# Patient Record
Sex: FEMALE | Race: WHITE | NOT HISPANIC OR LATINO | Employment: PART TIME | URBAN - METROPOLITAN AREA
[De-identification: names, ages, dates, MRNs, and addresses within clinical notes are randomized per-mention and may not be internally consistent; named-entity substitution may affect disease eponyms.]

---

## 2022-02-09 ENCOUNTER — OFFICE VISIT (OUTPATIENT)
Dept: PSYCHIATRY | Facility: CLINIC | Age: 22
End: 2022-02-09
Payer: COMMERCIAL

## 2022-02-09 DIAGNOSIS — F43.23 ADJUSTMENT DISORDER WITH MIXED ANXIETY AND DEPRESSED MOOD: Primary | ICD-10-CM

## 2022-02-09 PROCEDURE — 90791 PR PSYCHIATRIC DIAGNOSTIC EVALUATION: ICD-10-PCS | Mod: 95,,, | Performed by: SOCIAL WORKER

## 2022-02-09 PROCEDURE — 90791 PSYCH DIAGNOSTIC EVALUATION: CPT | Mod: 95,,, | Performed by: SOCIAL WORKER

## 2022-02-13 NOTE — PROGRESS NOTES
Psychiatry Initial Visit (PhD/LCSW)  Diagnostic Interview - CPT 66463    Date: 2/9/2022    Site: [facility] is on-site; patient's location: [facility] ([place], [place])       Referral source: self-referral     Clinical status of patient: Outpatient    Vargas Moore, a 21 y.o. female, for initial evaluation visit.  Met with patient.    Chief complaint/reason for encounter: depression, anxiety and interpersonal    History of present illness: 21 year old female patient is present for initial virtual visit with chief complaint of depression, anxiety, and interpersonal stress.  She is a freshman in college at [employer].  Reports that her first semester in college was difficult.  She had some difficulty adjusting to the lifestyle of a college student.  Feels like she went out drinking too much.  This semester she is focused on her academics.  She is also working part-time as a nanny.  She has been working on setting boundaries with her friends that do frequently go out to bars.  She reports having interpersonal stress.  Her father has a history of alcohol use disorder.  He has been in a few different treatment programs.  She is working on setting boundaries with him, as he continues to drink regularly.  In addition, her parents are currently going through a divorce.      Pain: noncontributory    Symptoms:   · Mood: some periods of sadness   · Anxiety: excessive anxiety/worry  · Substance abuse: denied  · Cognitive functioning: denied  · Health behaviors: noncontributory    Psychiatric history: none    Medical history: noncontributory    Family history of psychiatric illness: patient's father-history of alcohol use disorder, depression, and anxiety     Social history (marriage, employment, etc.): Patient is a freshman at [employer].  High school graduate.  Confucianism.  Raised by her parents.  One brother, one sister.      Substance use:   Alcohol: social   Drugs: none   Tobacco: none   Caffeine:  coffee     Current medications and drug reactions (include OTC, herbal): see medication list     Strengths and liabilities: Strength: Patient accepts guidance/feedback, Strength: Patient is expressive/articulate., Strength: Patient is motivated for change., Strength: Patient is physically healthy., Strength: Patient has positive support network., Strength: Patient has reasonable judgment., Liability: Patient lacks coping skills.    Current Evaluation:     Mental Status Exam:  General Appearance:  unremarkable, age appropriate   Speech: normal tone, normal rate, normal pitch, normal volume      Level of Cooperation: cooperative      Thought Processes: normal and logical   Mood: euthymic      Thought Content: normal, no suicidality, no homicidality, delusions, or paranoia   Affect: appropriate   Orientation: Oriented x3   Memory: recent >  intact, remote >  intact   Attention Span & Concentration: intact   Fund of General Knowledge: intact and appropriate to age and level of education   Abstract Reasoning: not assessed    Judgment & Insight: intact     Language  intact     Diagnostic Impression - Plan:       ICD-10-CM ICD-9-CM   1. Adjustment disorder with mixed anxiety and depressed mood  F43.23 309.28       Plan:individual psychotherapy    Return to Clinic: as scheduled    Length of Service (minutes): 45

## 2022-06-24 ENCOUNTER — OFFICE VISIT (OUTPATIENT)
Dept: PSYCHIATRY | Facility: CLINIC | Age: 22
End: 2022-06-24
Payer: COMMERCIAL

## 2022-06-24 DIAGNOSIS — F43.23 ADJUSTMENT DISORDER WITH MIXED ANXIETY AND DEPRESSED MOOD: Primary | ICD-10-CM

## 2022-06-24 PROCEDURE — 90834 PSYTX W PT 45 MINUTES: CPT | Mod: S$GLB,,, | Performed by: SOCIAL WORKER

## 2022-06-24 PROCEDURE — 90834 PR PSYCHOTHERAPY W/PATIENT, 45 MIN: ICD-10-PCS | Mod: S$GLB,,, | Performed by: SOCIAL WORKER

## 2022-06-27 NOTE — PROGRESS NOTES
Individual Psychotherapy (PhD/LCSW)    6/24/2022    Site:  [facility]         Therapeutic Intervention: Met with patient.  Outpatient - Insight oriented psychotherapy 45 min - CPT code 84166 and Outpatient - Supportive psychotherapy 45 min - CPT Code 75314    Chief complaint/reason for encounter: depression, anxiety and interpersonal     Interval history and content of current session: Patient returned to the clinic today for follow up appointment.  Her father continues to be in active addiction.  He is refusing to return to rehab for alcohol use disorder.  Patient is tearful during session, when reflecting on how his addiction negatively impacts she and her siblings.  She reports that his behavior is often erratic and unpredictable.  He will be critical of her, and accuse her of taking sides with her mother during the divorce process.  Discussed ways for patient to maintain adequate boundaries and limits with her father.  Discussed the construct of codependency with patient.  She has been working this summer as a [employer], and is planning on returning to [employer] next semester.       Treatment plan:  · Target symptoms: depression, anxiety , adjustment  · Why chosen therapy is appropriate versus another modality: relevant to diagnosis, patient responds to this modality  · Outcome monitoring methods: self-report, observation  · Therapeutic intervention type: insight oriented psychotherapy, supportive psychotherapy    Risk parameters:  Patient reports no suicidal ideation  Patient reports no homicidal ideation  Patient reports no self-injurious behavior  Patient reports no violent behavior    Verbal deficits: None    Patient's response to intervention:  The patient's response to intervention is accepting.    Progress toward goals and other mental status changes:  The patient's progress toward goals is fair .    Diagnosis:     ICD-10-CM ICD-9-CM   1. Adjustment disorder with mixed anxiety and depressed mood   F43.23 309.28       Plan:  individual psychotherapy    Return to clinic: as scheduled    Length of Service (minutes): 45

## 2022-08-02 ENCOUNTER — PATIENT MESSAGE (OUTPATIENT)
Dept: PSYCHIATRY | Facility: CLINIC | Age: 22
End: 2022-08-02
Payer: COMMERCIAL

## 2022-08-18 ENCOUNTER — OFFICE VISIT (OUTPATIENT)
Dept: PSYCHIATRY | Facility: CLINIC | Age: 22
End: 2022-08-18
Payer: COMMERCIAL

## 2022-08-18 DIAGNOSIS — F43.23 ADJUSTMENT DISORDER WITH MIXED ANXIETY AND DEPRESSED MOOD: Primary | ICD-10-CM

## 2022-08-18 PROCEDURE — 90834 PR PSYCHOTHERAPY W/PATIENT, 45 MIN: ICD-10-PCS | Mod: 95,,, | Performed by: SOCIAL WORKER

## 2022-08-18 PROCEDURE — 90834 PSYTX W PT 45 MINUTES: CPT | Mod: 95,,, | Performed by: SOCIAL WORKER

## 2022-08-21 NOTE — PROGRESS NOTES
Individual Psychotherapy (PhD/LCSW)    8/18/2022    Site:  [facility] is on-site; patient's location: her home.           Therapeutic Intervention: Met with patient.  Outpatient - Insight oriented psychotherapy 45 min - CPT code 70273 and Outpatient - Supportive psychotherapy 45 min - CPT Code 22498    Chief complaint/reason for encounter: depression, anxiety and interpersonal     Interval history and content of current session: Patient is present for follow up virtual visit.  Doing well overall.  She has not been speaking with her father, which she feels has been a good boundary for her to maintain.  Feels like her paternal grandmother has been making her feel some guilt about this.  She is going to return to [employer] in the fall.  Has a little anxiety about this.  Discussed techniques for stress management with patient.        Treatment plan:  · Target symptoms: depression, anxiety , adjustment  · Why chosen therapy is appropriate versus another modality: relevant to diagnosis, patient responds to this modality  · Outcome monitoring methods: self-report, observation  · Therapeutic intervention type: insight oriented psychotherapy, supportive psychotherapy    Risk parameters:  Patient reports no suicidal ideation  Patient reports no homicidal ideation  Patient reports no self-injurious behavior  Patient reports no violent behavior    Verbal deficits: None    Patient's response to intervention:  The patient's response to intervention is accepting.    Progress toward goals and other mental status changes:  The patient's progress toward goals is good.    Diagnosis:     ICD-10-CM ICD-9-CM   1. Adjustment disorder with mixed anxiety and depressed mood  F43.23 309.28       Plan:  individual psychotherapy    Return to clinic: as scheduled    Length of Service (minutes): 45

## 2023-05-03 ENCOUNTER — OFFICE VISIT (OUTPATIENT)
Dept: PSYCHIATRY | Facility: CLINIC | Age: 23
End: 2023-05-03
Payer: COMMERCIAL

## 2023-05-03 DIAGNOSIS — F43.23 ADJUSTMENT DISORDER WITH MIXED ANXIETY AND DEPRESSED MOOD: Primary | ICD-10-CM

## 2023-05-03 PROCEDURE — 90834 PR PSYCHOTHERAPY W/PATIENT, 45 MIN: ICD-10-PCS | Mod: 95,,, | Performed by: SOCIAL WORKER

## 2023-05-03 PROCEDURE — 90834 PSYTX W PT 45 MINUTES: CPT | Mod: 95,,, | Performed by: SOCIAL WORKER
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
## 2023-08-16 ENCOUNTER — OFFICE VISIT (OUTPATIENT)
Dept: PSYCHIATRY | Facility: CLINIC | Age: 23
End: 2023-08-16
Payer: COMMERCIAL

## 2023-08-16 DIAGNOSIS — F43.23 ADJUSTMENT DISORDER WITH MIXED ANXIETY AND DEPRESSED MOOD: Primary | ICD-10-CM

## 2023-08-16 PROCEDURE — 90834 PR PSYCHOTHERAPY W/PATIENT, 45 MIN: ICD-10-PCS | Mod: 95,,, | Performed by: SOCIAL WORKER

## 2023-08-16 PROCEDURE — 90834 PSYTX W PT 45 MINUTES: CPT | Mod: 95,,, | Performed by: SOCIAL WORKER

## 2023-08-21 NOTE — PROGRESS NOTES
Individual Psychotherapy (PhD/LCSW)    8/16/2023    Site:  [facility] is on-site; patient's location: her home.                   Therapeutic Intervention: Met with patient.  Outpatient - Insight oriented psychotherapy 45 min - CPT code 58148 and Outpatient - Supportive psychotherapy 45 min - CPT Code 26901    Chief complaint/reason for encounter: depression, anxiety, and interpersonal     Interval history and content of current session: Patient is present for follow up virtual visit.  She feels like she has set adequate boundaries with her father.  He continues to be in active addiction.  Continue to discuss the construct of codependency with patient.  Overall, her mood has been good, stable.  She feels like her self-care has been good.  She has been jogging regularly.    Treatment plan:  Target symptoms: depression, anxiety , adjustment  Why chosen therapy is appropriate versus another modality: relevant to diagnosis, patient responds to this modality  Outcome monitoring methods: self-report, observation  Therapeutic intervention type: insight oriented psychotherapy, supportive psychotherapy    Risk parameters:  Patient reports no suicidal ideation  Patient reports no homicidal ideation  Patient reports no self-injurious behavior  Patient reports no violent behavior    Verbal deficits: None    Patient's response to intervention:  The patient's response to intervention is accepting.    Progress toward goals and other mental status changes:  The patient's progress toward goals is good.    Diagnosis:     ICD-10-CM ICD-9-CM   1. Adjustment disorder with mixed anxiety and depressed mood  F43.23 309.28       Plan:  individual psychotherapy    Return to clinic: as scheduled    Length of Service (minutes): 45

## 2023-11-13 ENCOUNTER — OFFICE VISIT (OUTPATIENT)
Dept: PSYCHIATRY | Facility: CLINIC | Age: 23
End: 2023-11-13
Payer: COMMERCIAL

## 2023-11-13 DIAGNOSIS — F43.23 ADJUSTMENT DISORDER WITH MIXED ANXIETY AND DEPRESSED MOOD: Primary | ICD-10-CM

## 2023-11-13 PROCEDURE — 90834 PSYTX W PT 45 MINUTES: CPT | Mod: S$GLB,,, | Performed by: SOCIAL WORKER

## 2023-11-13 PROCEDURE — 90834 PR PSYCHOTHERAPY W/PATIENT, 45 MIN: ICD-10-PCS | Mod: S$GLB,,, | Performed by: SOCIAL WORKER

## 2023-11-16 NOTE — PROGRESS NOTES
Individual Psychotherapy (PhD/LCSW)    11/13/2023    Site:  [facility]         Therapeutic Intervention: Met with patient.  Outpatient - Insight oriented psychotherapy 45 min - CPT code 89797 and Outpatient - Supportive psychotherapy 45 min - CPT Code 09305    Chief complaint/reason for encounter: depression, anxiety, and interpersonal     Interval history and content of current session: Patient returned to the clinic today for follow up appointment.  Overall, she feels like she is doing well.  Her mood has been good, stable.  Continues to work on setting boundaries with her father.  He continues to drink heavily.  She has anxiety when her siblings stay with him and his mother.  Patient's mother had an arrest for DUI several months back, which has yet to be resolved.  Continue to discuss the construct of codependency with patient.  Discussed ways for her to maintain adequate self-care.       Treatment plan:  Target symptoms: depression, anxiety , adjustment  Why chosen therapy is appropriate versus another modality: relevant to diagnosis, patient responds to this modality  Outcome monitoring methods: self-report, observation  Therapeutic intervention type: insight oriented psychotherapy, supportive psychotherapy    Risk parameters:  Patient reports no suicidal ideation  Patient reports no homicidal ideation  Patient reports no self-injurious behavior  Patient reports no violent behavior    Verbal deficits: None    Patient's response to intervention:  The patient's response to intervention is accepting.    Progress toward goals and other mental status changes:  The patient's progress toward goals is good.    Diagnosis:     ICD-10-CM ICD-9-CM   1. Adjustment disorder with mixed anxiety and depressed mood  F43.23 309.28       Plan:  individual psychotherapy    Return to clinic: as scheduled    Length of Service (minutes): 45